# Patient Record
(demographics unavailable — no encounter records)

---

## 2024-11-26 NOTE — PLAN
[FreeTextEntry1] : continue to keep track of headaches, if they consistently start to happen 4 times a month or more, Start Magnesium 400mg nightly and Vitamin B2 (riboflavin) 400mg nightly advil 300mg BID as needed for headaches will consider brain imaging if no improvement in headaches, worsening headache or new symptoms develop  Lifestyle Goals:  Regular sleep/waking times (on both weekdays and weekends) - Children 3-4yo:10-13 hrs; 6-11yo: 9-12 hrs; teens 13+: 8-10 hrs  Regular exercise - 30 mins a day, 5 days a week  Regular meals (protein rich breakfast within 30 min of waking and no skipping meals)  Stay hydrated (1 ounce/kg body weight, 8-10 cups of water per day for teens)  Can refer to www.headachereliefguide.com  for more information on healthy habits

## 2024-11-26 NOTE — HISTORY OF PRESENT ILLNESS
[FreeTextEntry1] : headaches started a few years ago, got more frequent this year, diagnosed with allergies, started treatment for them but the headaches are still there the headaches are worse at her moms house, patient splits her time between moms and dads house 50/50 pain located across the forehead described as pressure and throbbing pain duration of headaches 30 min to the whole day frequency of headaches 3-4 times a month on average   associated symptoms: +nausea/vomiting, +photophobia   treated with: tylenol, motrin 12.5ml (usually helps)   aura? none   other symptoms with headaches- blurry vision   positional component? do not wake her up from sleep   triggers: skipping meals, changing in the weather, dehydration  previous work up: imaging- none blood work- none ophtho- not since the headaches started    episodic conditions and other pediatric relevant conditions? +motion sickness   previous acute medications: motrin, tylenol   previous prevention medications: none   lifestyle: 8-9 hours of sleep (wakes up at night in her moms house but not dad) yes breakfast 4 cups of water   menses? na menses? [Head Trauma] : no head trauma [Infections] : no infections [Stressors] : no stressors [Previous Imaging] : none

## 2024-11-26 NOTE — ASSESSMENT
[FreeTextEntry1] : 8yo female with pmh hydronephrosis who is here for initial evaluation of headaches. Headaches are meeting criteria for migraines without aura, now happening about 2-4 times a month. Neurological exam non focal. +family history of migraines and personal markers of migraines. Will optimize prevention and abortive treatment.

## 2025-05-14 NOTE — ASSESSMENT
[FreeTextEntry1] : 10yo female with pmh hydronephrosis who is here for follow up of migraines without aura, recently increased in frequency to multiple times a week. Will optimize prevention treatment. If no improvement in headaches, worsening of headaches or new symptoms, will consider brain imaging.

## 2025-05-14 NOTE — REASON FOR VISIT
[Follow-Up Evaluation] : a follow-up evaluation for [Headache] : headache [Mother] : mother [Father] : father

## 2025-05-14 NOTE — PLAN
[FreeTextEntry1] : Start Magnesium 400mg nightly and Vitamin B2 (riboflavin) 400mg nightly advil 300-400mg BID as needed for headaches will consider brain imaging if no improvement in headaches, worsening headache or new symptoms develop  Lifestyle Goals:  Regular sleep/waking times (on both weekdays and weekends) - Children 3-6yo:10-13 hrs; 6-13yo: 9-12 hrs; teens 13+: 8-10 hrs  Regular exercise - 30 mins a day, 5 days a week  Regular meals (protein rich breakfast within 30 min of waking and no skipping meals)  Stay hydrated (1 ounce/kg body weight, 8-10 cups of water per day for teens)  Can refer to www.headachereliefguide.com  for more information on healthy habits

## 2025-05-14 NOTE — HISTORY OF PRESENT ILLNESS
[FreeTextEntry1] : follow up 5/14/25: increased frequency in headaches, happening multiple times a week has nausea/vomiting with her headaches motrin helps when she takes it, takes 15 ml  previous history: headaches started a few years ago, got more frequent this year, diagnosed with allergies, started treatment for them but the headaches are still there the headaches are worse at her moms house, patient splits her time between moms and dads house 50/50 pain located across the forehead described as pressure and throbbing pain duration of headaches 30 min to the whole day frequency of headaches 3-4 times a month on average  associated symptoms: +nausea/vomiting, +photophobia  treated with: tylenol, motrin 12.5ml (usually helps)  aura? none  other symptoms with headaches- blurry vision  positional component? do not wake her up from sleep  triggers: skipping meals, changing in the weather, dehydration  previous work up: imaging- none blood work- none ophtho- not since the headaches started  episodic conditions and other pediatric relevant conditions? +motion sickness  previous acute medications: motrin, tylenol  previous prevention medications: none  lifestyle: 8-9 hours of sleep (wakes up at night in her moms house but not dad) yes breakfast 4 cups of water  menses? na menses?   Headache HPI   In the context of: no head trauma, no infections, no stressors   Previous Imaging: none. Headache Description